# Patient Record
Sex: FEMALE | Race: OTHER
[De-identification: names, ages, dates, MRNs, and addresses within clinical notes are randomized per-mention and may not be internally consistent; named-entity substitution may affect disease eponyms.]

---

## 2020-07-08 ENCOUNTER — HOSPITAL ENCOUNTER (OUTPATIENT)
Dept: HOSPITAL 72 - GAS | Age: 55
Discharge: HOME | End: 2020-07-08
Payer: MEDICARE

## 2020-07-08 VITALS — SYSTOLIC BLOOD PRESSURE: 102 MMHG | DIASTOLIC BLOOD PRESSURE: 52 MMHG

## 2020-07-08 VITALS — HEIGHT: 63 IN | WEIGHT: 125 LBS | BODY MASS INDEX: 22.15 KG/M2

## 2020-07-08 VITALS — SYSTOLIC BLOOD PRESSURE: 112 MMHG | DIASTOLIC BLOOD PRESSURE: 56 MMHG

## 2020-07-08 VITALS — SYSTOLIC BLOOD PRESSURE: 98 MMHG | DIASTOLIC BLOOD PRESSURE: 52 MMHG

## 2020-07-08 VITALS — DIASTOLIC BLOOD PRESSURE: 54 MMHG | SYSTOLIC BLOOD PRESSURE: 107 MMHG

## 2020-07-08 VITALS — SYSTOLIC BLOOD PRESSURE: 116 MMHG | DIASTOLIC BLOOD PRESSURE: 65 MMHG

## 2020-07-08 VITALS — SYSTOLIC BLOOD PRESSURE: 115 MMHG | DIASTOLIC BLOOD PRESSURE: 60 MMHG

## 2020-07-08 VITALS — SYSTOLIC BLOOD PRESSURE: 113 MMHG | DIASTOLIC BLOOD PRESSURE: 72 MMHG

## 2020-07-08 VITALS — DIASTOLIC BLOOD PRESSURE: 40 MMHG | SYSTOLIC BLOOD PRESSURE: 91 MMHG

## 2020-07-08 DIAGNOSIS — Z90.12: ICD-10-CM

## 2020-07-08 DIAGNOSIS — K21.9: ICD-10-CM

## 2020-07-08 DIAGNOSIS — K63.5: ICD-10-CM

## 2020-07-08 DIAGNOSIS — K64.8: ICD-10-CM

## 2020-07-08 DIAGNOSIS — Z85.3: ICD-10-CM

## 2020-07-08 DIAGNOSIS — Z12.11: Primary | ICD-10-CM

## 2020-07-08 PROCEDURE — 93005 ELECTROCARDIOGRAM TRACING: CPT

## 2020-07-08 PROCEDURE — 94003 VENT MGMT INPAT SUBQ DAY: CPT

## 2020-07-08 PROCEDURE — 94150 VITAL CAPACITY TEST: CPT

## 2020-07-08 PROCEDURE — 45380 COLONOSCOPY AND BIOPSY: CPT

## 2020-07-08 NOTE — PRE-PROCEDURE NOTE/ATTESTATION
Pre-Procedure Note/Attestation


Complete Prior to Procedure


Planned Procedure:  not applicable


Procedure Narrative:


colonoscopy





Indications for Procedure


Pre-Operative Diagnosis:


screening colon





Attestation


I attest that I discussed the nature of the procedure; its benefits; risks and 

complications; and alternatives (and the risks and benefits of such alternatives

), prior to the procedure, with the patient (or the patient's legal 

representative).





I attest that, if there was a reasonable possibility of needing a blood 

transfusion, the patient (or the patient's legal representative) was given the 

[facility] standardized written summary, pursuant 

to the Harsh Dinesh Blood Safety Act ([place] Health and Safety Code # 1645, as 

amended).





I attest that I re-evaluated the patient just prior to the surgery and that 

there has been no change in the patient's H&P, except as documented below:











Gigi Tsai MD Jul 8, 2020 09:33

## 2020-07-08 NOTE — ENDOSCOPY PROCEDURE NOTE
Endoscopy Procedure Note


General


Indication for Procedure:  screening


Procedures Performed:  colonoscopy


Operative Findings/Diagnosis:  one polyp


Specimen:  yes


Pt Tolerated Procedure Well:  Yes


Estimated Blood Loss:  none





Anesthesia


Anesthesiologist:  ilia


Anesthesia:  MAC





Inserted Devices


Implant(s) used?:  No





Quality


Quality of Bowel Preparation:  Excellent


Did scope reach the cecum?:  Yes


Was there any complications?:  No





GI Core Measures


50 yrs or older w/o bx or poly:  No


10yrs. F/U recommended:  Yes


If not recommended, why?:  Above average risk


18 years or older w/prev. colo:  No











Gigi Tsai MD Jul 8, 2020 10:10

## 2020-07-08 NOTE — ANETHESIA PREOPERATIVE EVAL
Anesthesia Pre-op PMH/ROS


General


Date of Evaluation:  Jul 8, 2020


Time of Evaluation:  09:37


Anesthesiologist:  Mikie


ASA Score:  ASA 2


Mallampati Score


Class I : Soft palate, uvula, fauces, pillars visible


Class II: Soft palate, uvula, fauces visible


Class III: Soft palate, base of uvula visible


Class IV: Only hard plate visible


Mallampati Classification:  Class II


Surgeon:  Quin


Diagnosis:  Colon CA screening


Surgical Procedure:  Colonoscopy


Anesthesia History:  none


Family History:  no anesthesia problems


Allergies:  


Coded Allergies:  


     No Known Allergies (Unverified , 7/8/20)


Medications:  see eMAR


Patient NPO?:  Yes





Past Medical History


Cardiovascular:  Denies: HTN, CAD, MI, valve dz, arrhythmia, other


Pulmonary:  Denies: asthma, COPD, JULIEN, other


Gastrointestinal/Genitourinary:  Reports: GERD; 


   Denies: CRI, ESRD, other


Neurologic/Psychiatric:  Reports: depression/anxiety; 


   Denies: dementia, CVA, TIA, other


HEENT:  Denies: cataract (L), cataract (R), glaucoma, [place] (L), [place] (R), other


Hematology/Immune:  Reports: other - Breast CA s/p Sx


PMH Narrative:


as above


PSxH Narrative:


L partial mastectomy





Anesthesia Pre-op Phys. Exam


Physician Exam





Last Vital Signs








  Date Time  Temp Pulse Resp B/P (MAP) Pulse Ox O2 Delivery O2 Flow Rate FiO2


 


7/8/20 09:22      Room Air  


 


7/8/20 09:18 98.6 85 18 113/72 100   








Constitutional:  NAD


Neurologic:  CN 2-12 intact


Cardiovascular:  RRR, no M/R/G


Respiratory:  CTA


Gastrointestinal:  S/NT/ND





Airway Exam


Mallampati Score:  Class II


MO:  full


Neck:  flexible


ROM:  full


Teeth:  intact


Dentures:  no upper, no lower





Anesthesia Pre-op A/P


Labs


see chart





Risk Assessment & Plan


Assessment:


ASA 2


Plan:


Jona Matta MD Jul 8, 2020 09:39

## 2020-07-08 NOTE — SHORT STAY SURGERY H&P
History of Present Illness


History of Present Illness


Chief Complaint


screening colon


HPI


Cherri Scanlon is a 54 year old female who was admitted on  for Screening





Patient History


Allergies:  


Coded Allergies:  


     No Known Allergies (Unverified , 7/8/20)


PAST MEDICAL HISTORY:  


(1) Breast CA





Review of Systems


Cardiovascular:  Reports: no symptoms


Respiratory:  Reports: no symptoms


Skeletal:  Reports: no symptoms


Gastrointestinal:  Reports: no symptoms


Genitourinary:  Reports: no symptoms


Endocrine:  Reports: no symptoms





Physical Exam


Vital Signs





Last Vital Signs








  Date Time  Temp Pulse Resp B/P (MAP) Pulse Ox O2 Delivery O2 Flow Rate FiO2


 


7/8/20 09:22      Room Air  


 


7/8/20 09:18 98.6 85 18 113/72 100   








Skin:  normal


HENT:  normal


Heart:  normal


Lungs:  normal


Abdomen:  normal


Extremities:  normal





Plan


Plan of Care


colonoscopy


Attestation


Are the patient's medical conditions optimized for surgery?


Attestation Response:  yes











Gigi Tsai MD Jul 8, 2020 09:34

## 2020-07-08 NOTE — 48 HOUR POST ANESTHESIA EVAL
Post Anesthesia Evaluation


Procedure:  Colonoscopy, polypectomy


Date of Evaluation:  Jul 8, 2020


Time of Evaluation:  10:38


Blood Pressure Systolic:  108


0:  58


Pulse Rate:  72


Respiratory Rate:  20


Temperature (Fahrenheit):  97.6


O2 Sat by Pulse Oximetry:  98


Airway:  patent


Nausea:  No


Vomiting:  No


Pain Intensity:  1


Hydration Status:  adequate


Cardiopulmonary Status:


stable


Mental Status/LOC:  patient returned to baseline


Follow-up Care/Observations:


n/a


Post-Anesthesia Complications:


none


Follow-up care needed:  ready to discharge











Jona Deluna MD Jul 8, 2020 10:39

## 2020-07-08 NOTE — PROCEDURE NOTE
DATE OF PROCEDURE:  07/08/2020

SURGEON:  Gigi Tsai MD.



PROCEDURE:  Colonoscopy.



ANESTHESIA:  Per Dr. Deluna.



INSTRUMENT:  Olympus adult flexible colonoscope.



INDICATIONS:  Screening colonoscopy evaluation.



REASON FOR PROCEDURE:  The procedure, risks, benefits, and possible

consequences, including hemorrhage, aspiration, perforation and infection,

and alternative treatments, were explained to the patient/legal guardian

by Dr. Gigi Tsai and the patient/legal guardian understood and

accepted these risks.



PROCEDURE IN DETAIL:  After informed consent was obtained and the patient

was adequately sedated.  First rectal exam was performed which was

positive for internal hemorrhoids.  Then, the scope was advanced from the

rectum into the cecum documented by appendix orifice, ileocecal valve, and

right upper quadrant palpation.  Quality of prep was good.



The patient had one polyp in the sigmoid measured roughly about 4 mm,

removed with cold biopsy forceps technique.  The rest of the examination

grossly within normal limits.  Retroflexion of rectum showed evidence of

internal hemorrhoids.



SUMMARY OF FINDINGS:

1. One colonic polyp in the sigmoid removed with cold biopsy forceps

technique.

2. Internal hemorrhoids.



RECOMMENDATIONS:

1. Follow biopsy results and treat accordingly.

2. Repeat colonoscopy in 5 years.



I want to thank Dr. Smith for this kind referral.









  ______________________________________________

  Gigi Tsai M.D.





DR:  ELLA

D:  07/08/2020 10:12

T:  07/08/2020 15:03

JOB#:  [id number]

CC:  Dr. Smith

## 2020-07-08 NOTE — IMMEDIATE POST-OP EVALUATION
Immediate Post-Op Evalulation


Immediate Post-Op Evalulation


Procedure:  Colonoscopy, polypectomy


Date of Evaluation:  Jul 8, 2020


Time of Evaluation:  10:14


IV Fluids:  800


Blood Products:  none


Estimated Blood Loss:  none


Urinary Output:  none


Blood Pressure Systolic:  98


Blood Pressure Diastolic:  56


Pulse Rate:  72


Respiratory Rate:  20


O2 Sat by Pulse Oximetry:  99


Temperature (Fahrenheit):  97.6


Pain Score (1-10):  1


Nausea:  No


Vomiting:  No


Complications


none


Patient Status:  awake, patent, none


Hydration Status:  adequate











Jona Deluna MD Jul 8, 2020 10:15